# Patient Record
Sex: MALE | Race: WHITE | Employment: OTHER | URBAN - METROPOLITAN AREA
[De-identification: names, ages, dates, MRNs, and addresses within clinical notes are randomized per-mention and may not be internally consistent; named-entity substitution may affect disease eponyms.]

---

## 2022-06-28 ENCOUNTER — HOSPITAL ENCOUNTER (EMERGENCY)
Age: 72
Discharge: HOME | End: 2022-06-28
Attending: EMERGENCY MEDICINE
Payer: MEDICARE

## 2022-06-28 VITALS
WEIGHT: 204 LBS | DIASTOLIC BLOOD PRESSURE: 88 MMHG | HEART RATE: 62 BPM | RESPIRATION RATE: 16 BRPM | TEMPERATURE: 97.5 F | BODY MASS INDEX: 27.63 KG/M2 | HEIGHT: 72 IN | SYSTOLIC BLOOD PRESSURE: 136 MMHG | OXYGEN SATURATION: 96 %

## 2022-06-28 DIAGNOSIS — S01.81XA FACIAL LACERATION, INITIAL ENCOUNTER: Primary | ICD-10-CM

## 2022-06-28 PROCEDURE — 99282 EMERGENCY DEPT VISIT SF MDM: CPT

## 2022-06-28 PROCEDURE — 12001 RPR S/N/AX/GEN/TRNK 2.5CM/<: CPT

## 2022-06-28 RX ORDER — ATORVASTATIN CALCIUM 20 MG/1
20 TABLET, FILM COATED ORAL DAILY
COMMUNITY

## 2022-06-28 RX ORDER — METOPROLOL SUCCINATE 25 MG/1
25 TABLET, EXTENDED RELEASE ORAL DAILY
COMMUNITY

## 2022-06-28 ASSESSMENT — PAIN - FUNCTIONAL ASSESSMENT
PAIN_FUNCTIONAL_ASSESSMENT: 0-10
PAIN_FUNCTIONAL_ASSESSMENT: NONE - DENIES PAIN

## 2022-06-28 ASSESSMENT — PAIN SCALES - GENERAL: PAINLEVEL_OUTOF10: 0

## 2022-06-28 NOTE — ED PROVIDER NOTES
[facility] ED  EMERGENCY DEPARTMENT ENCOUNTER      Pt Name: Luana Mishra  MRN: [id number]  [place] 1950  Date of evaluation: 6/28/2022  Provider: Mickie Dawson DO    CHIEF COMPLAINT       Chief Complaint   Patient presents with    Laceration     Tripped while caring boxes today and lacerated his lower lip - bleeding controlled at time of arrival     Chief complaint: Lip laceration  History of chief complaint: This 59-year-old gentleman presents the emergency department complaining of tripping going up the stairs carrying a box states he fell forward and struck his lower lip into the box like cutting it. Patient states it did go through to the inner lip. Patient denies any injury to his teeth jaw range of motion is full and intact. There was no loss of consciousness. Patient denies any headache or neck pain no numb tingling or weakness to the extremities. Patient states his tetanus shot is up-to-date. Patient denies other complaint    Nursing Notes were reviewed. REVIEW OF SYSTEMS    (2-9 systems for level 4, 10 or more for level 5)     Review of Systems  Pertinent findings documented in the history of present illness  Except as noted above the remainder of the review of systems was reviewed and negative. PAST MEDICAL HISTORY     Past Medical History:   Diagnosis Date    Diabetes mellitus ([address]     Hyperlipidemia     Hypertension          SURGICAL HISTORY       Past Surgical History:   Procedure Laterality Date    CARDIAC SURGERY           CURRENT MEDICATIONS       Previous Medications    ATORVASTATIN (LIPITOR) 20 MG TABLET    Take 20 mg by mouth daily    METFORMIN (GLUCOPHAGE) 500 MG TABLET    Take 500 mg by mouth 2 times daily (with meals)    METOPROLOL SUCCINATE (TOPROL XL) 25 MG EXTENDED RELEASE TABLET    Take 25 mg by mouth daily       ALLERGIES     Patient has no known allergies. FAMILY HISTORY     No family history on file.        SOCIAL HISTORY       Social History Socioeconomic History    Marital status: Single     Spouse name: Not on file    Number of children: Not on file    Years of education: Not on file    Highest education level: Not on file   Occupational History    Not on file   Tobacco Use    Smoking status: Never Smoker    Smokeless tobacco: Never Used   Substance and Sexual Activity    Alcohol use: Yes     Comment: soc    Drug use: Never    Sexual activity: Not Currently     Partners: Female   Other Topics Concern    Not on file   Social History Narrative    Not on file     Social Determinants of Health     Financial Resource Strain:     Difficulty of Paying Living Expenses: Not on file   Food Insecurity:     Worried About Running Out of Food in the Last Year: Not on file    Tahir of Food in the Last Year: Not on file   Transportation Needs:     Lack of Transportation (Medical): Not on file    Lack of Transportation (Non-Medical):  Not on file   Physical Activity:     Days of Exercise per Week: Not on file    Minutes of Exercise per Session: Not on file   Stress:     Feeling of Stress : Not on file   Social Connections:     Frequency of Communication with Friends and Family: Not on file    Frequency of Social Gatherings with Friends and Family: Not on file    Attends Congregation Services: Not on file    Active Member of [address] [place] [address] Sleep Number or Organizations: Not on file    Attends Club or Organization Meetings: Not on file    Marital Status: Not on file   Intimate Partner Violence:     Fear of Current or Ex-Partner: Not on file    Emotionally Abused: Not on file    Physically Abused: Not on file    Sexually Abused: Not on file   Housing Stability:     Unable to Pay for Housing in the Last Year: Not on file    Number of [place] in the Last Year: Not on file    Unstable Housing in the Last Year: Not on file         PHYSICAL EXAM    (up to 7 for level 4, 8 or more for level 5)     ED Triage Vitals [06/28/22 1550]   BP Temp Temp Source Heart Rate Resp SpO2 Height Weight   (!) 140/80 97.5 °F (36.4 °C) Oral 74 20 98 % 6' (1.829 m) 204 lb (92.5 kg)       Physical Exam   General appearance: Patient is awake alert interactive appropriate nontoxic in no distress  Head is atraumatic normocephalic no scalp tenderness swelling step-off or deformity  Face: There is a 2 cm linear laceration anterior chin/lower lip does go through and through at the base of the lower lip and gums there is an area approximately 1 cm separation separation  there is a small tear in the upper frenulum is well teeth are intact. TMJ range of motion is full and intact  Neck: Nontender to palpation no midline tenderness step-off or deformity no paravertebral muscle tenderness range of motion is full and intact  Heart is regular rate and rhythm  Lungs are clear to auscultation with equal breast bilaterally  Neurologic: Patient is awake alert oriented speech is clear and fluent pupils are equal and reactive extraocular muscles are intact facial symmetry is intact tongue is midline strength testing is full and symmetric bilaterally both the upper and lower extremity sensation is intact in all 4 extremities      EMERGENCY DEPARTMENT COURSE and DIFFERENTIAL DIAGNOSIS/MDM:   Vitals:    Vitals:    06/28/22 1550   BP: (!) 140/80   Pulse: 74   Resp: 20   Temp: 97.5 °F (36.4 °C)   TempSrc: Oral   SpO2: 98%   Weight: 204 lb (92.5 kg)   Height: 6' (1.829 m)     Treatment and course:   Laceration repair performed by ED physician: Area was cleansed with antibacterial solution 1% lidocaine was injected locally the wound was explored there were no gross foreign bodies appreciated the wound was irrigated. Margins are well aligned 4 sutures were placed of 5-0 Ethilon the outer lip. The inner lower lip laceration is more of a separation from the gingiva no good place to seat suture.     I did discuss with patient mouth rinse with water following eating to prevent food particles from getting stuck and causing infection      FINAL IMPRESSION      1. Facial laceration, initial encounter          DISPOSITION/PLAN   DISPOSITION    Patient discharged home advised keep area clean and dry, rinse mouth after eating. Monitor closely return if any change or worsening increased pain redness swelling drainage from the area and to follow-up with primary physician for suture removal in the next 5-7 days    PATIENT REFERRED TO:  Ravindra Basilio [address]  [address], [place] [phone]  In 6 days  For suture removal      DISCHARGE MEDICATIONS:  New Prescriptions    No medications on file     Controlled Substances Monitoring:     No flowsheet data found.     (Please note that portions of this note were completed with a voice recognition program.  Efforts were made to edit the dictations but occasionally words are mis-transcribed.)    Winnie Guillen DO (electronically signed)  Attending Emergency Physician            Winnie Guillen DO  06/28/22 2016

## 2022-06-28 NOTE — ED TRIAGE NOTES
Patient presents to ED with c/o laceration to bottom lip after tripping and falling while carrying boxes today while at home.  Denies any LOC

## 2022-06-28 NOTE — ED NOTES
Wound to area just under lower lip, approx 1 cm in length. Bleeding well controlled. Oral lacerations to upper and lower lip.       Man Mann RN  06/28/22 0775